# Patient Record
Sex: FEMALE | Race: WHITE | NOT HISPANIC OR LATINO | Employment: UNEMPLOYED | URBAN - NONMETROPOLITAN AREA
[De-identification: names, ages, dates, MRNs, and addresses within clinical notes are randomized per-mention and may not be internally consistent; named-entity substitution may affect disease eponyms.]

---

## 2018-12-11 RX ORDER — MELOXICAM 15 MG/1
15 TABLET ORAL DAILY
COMMUNITY

## 2018-12-11 RX ORDER — AMLODIPINE BESYLATE 5 MG/1
5 TABLET ORAL DAILY
COMMUNITY

## 2018-12-17 ENCOUNTER — ANESTHESIA EVENT (OUTPATIENT)
Dept: GASTROENTEROLOGY | Facility: HOSPITAL | Age: 68
End: 2018-12-17

## 2018-12-17 ENCOUNTER — HOSPITAL ENCOUNTER (OUTPATIENT)
Facility: HOSPITAL | Age: 68
Setting detail: HOSPITAL OUTPATIENT SURGERY
Discharge: HOME | End: 2018-12-17
Attending: INTERNAL MEDICINE | Admitting: INTERNAL MEDICINE

## 2018-12-17 ENCOUNTER — ANESTHESIA (OUTPATIENT)
Dept: GASTROENTEROLOGY | Facility: HOSPITAL | Age: 68
End: 2018-12-17

## 2018-12-17 VITALS
SYSTOLIC BLOOD PRESSURE: 133 MMHG | WEIGHT: 117 LBS | RESPIRATION RATE: 18 BRPM | BODY MASS INDEX: 22.09 KG/M2 | HEART RATE: 85 BPM | TEMPERATURE: 97.9 F | HEIGHT: 61 IN | OXYGEN SATURATION: 98 % | DIASTOLIC BLOOD PRESSURE: 67 MMHG

## 2018-12-17 PROCEDURE — 25010000002 PROPOFOL 10 MG/ML EMULSION: Performed by: NURSE ANESTHETIST, CERTIFIED REGISTERED

## 2018-12-17 RX ORDER — LIDOCAINE HYDROCHLORIDE 20 MG/ML
INJECTION, SOLUTION INFILTRATION; PERINEURAL AS NEEDED
Status: DISCONTINUED | OUTPATIENT
Start: 2018-12-17 | End: 2018-12-17 | Stop reason: SURG

## 2018-12-17 RX ORDER — DEXTROSE AND SODIUM CHLORIDE 5; .45 G/100ML; G/100ML
20 INJECTION, SOLUTION INTRAVENOUS CONTINUOUS
Status: DISCONTINUED | OUTPATIENT
Start: 2018-12-17 | End: 2018-12-17 | Stop reason: HOSPADM

## 2018-12-17 RX ORDER — ONDANSETRON 2 MG/ML
4 INJECTION INTRAMUSCULAR; INTRAVENOUS ONCE AS NEEDED
Status: DISCONTINUED | OUTPATIENT
Start: 2018-12-17 | End: 2018-12-17 | Stop reason: HOSPADM

## 2018-12-17 RX ORDER — PROPOFOL 10 MG/ML
VIAL (ML) INTRAVENOUS AS NEEDED
Status: DISCONTINUED | OUTPATIENT
Start: 2018-12-17 | End: 2018-12-17 | Stop reason: SURG

## 2018-12-17 RX ADMIN — PROPOFOL 20 MG: 10 INJECTION, EMULSION INTRAVENOUS at 13:15

## 2018-12-17 RX ADMIN — PROPOFOL 70 MG: 10 INJECTION, EMULSION INTRAVENOUS at 13:13

## 2018-12-17 RX ADMIN — DEXTROSE AND SODIUM CHLORIDE 20 ML/HR: 5; 450 INJECTION, SOLUTION INTRAVENOUS at 12:06

## 2018-12-17 RX ADMIN — PROPOFOL 20 MG: 10 INJECTION, EMULSION INTRAVENOUS at 13:21

## 2018-12-17 RX ADMIN — LIDOCAINE HYDROCHLORIDE 80 MG: 20 INJECTION, SOLUTION INFILTRATION; PERINEURAL at 13:13

## 2018-12-17 RX ADMIN — PROPOFOL 20 MG: 10 INJECTION, EMULSION INTRAVENOUS at 13:18

## 2018-12-17 NOTE — ANESTHESIA POSTPROCEDURE EVALUATION
Patient: Irma Chand Tripp    Procedure Summary     Date:  12/17/18 Room / Location:  [facility] ENDOSCOPY 2 / [facility] ENDOSCOPY    Anesthesia Start:  1310 Anesthesia Stop:  1326    Procedure:  COLONOSCOPY (N/A ) Diagnosis:       Abnormal stools      (Abnormal stools [R19.5])    Surgeon:  Jitendra Kaiser DO Provider:  Chandrakant Siddiqui CRNA    Anesthesia Type:  MAC ASA Status:  2          Anesthesia Type: MAC  Last vitals  BP   179/83 (12/17/18 1157)   Temp   98.4 °F (36.9 °C) (12/17/18 1157)   Pulse   95 (12/17/18 1157)   Resp   18 (12/17/18 1157)     SpO2   99 % (12/17/18 1157)     Post Anesthesia Care and Evaluation    Patient location during evaluation: PHASE II  Level of consciousness: awake  Pain score: 0  Pain management: adequate  Airway patency: patent  Anesthetic complications: No anesthetic complications  PONV Status: none  Cardiovascular status: acceptable and hemodynamically stable  Respiratory status: acceptable, spontaneous ventilation and room air  Hydration status: acceptable

## 2018-12-17 NOTE — H&P
Cat Cuevas DO,Psychiatric  Gastroenterology  Hepatology  Endoscopy  Board Certified in Internal Medicine and gastroenterology  [address], [address]  [place]. [address]  - [phone]   F - [phone]     GASTROENTEROLOGY HISTORY AND PHYSICAL  NOTE   CAT CUEVAS DO.         SUBJECTIVE:   12/17/2018    Name: Irma Almaguer  DOD: 1950        Chief Complaint:       Subjective : Positive [place] guard.  Screening    Patient is 68 y.o. female presents with desire for screening colonoscopy.      ROS/HISTORY/ CURRENT MEDICATIONS/OBJECTIVE/VS/PE:   Review of Systems:  All systems unremarkable unless specified below.  Constitutional   HENT  Eyes   Respiratory    Cardiovascular  Gastrointestinal   Endocrine  Genitourinary    Musculoskeletal   Skin  Allergic/Immunologic    Neurological    Hematological  Psychiatric/Behavioral    History:     Past Medical History:   Diagnosis Date   • Anxiety    • Hypertension      Past Surgical History:   Procedure Laterality Date   • KNEE ARTHROSCOPY     • SHOULDER ARTHROSCOPY       History reviewed. No pertinent family history.  Social History     Tobacco Use   • Smoking status: Never Smoker   • Smokeless tobacco: Never Used   Substance Use Topics   • Alcohol use: No     Frequency: Never   • Drug use: No     Medications Prior to Admission   Medication Sig Dispense Refill Last Dose   • amLODIPine (NORVASC) 5 MG tablet Take 5 mg by mouth Daily.   12/17/2018 at Unknown time   • meloxicam (MOBIC) 15 MG tablet Take 15 mg by mouth Daily.   12/16/2018 at Unknown time     Allergies:  Patient has no known allergies.    I have reviewed the patients medical history, surgical history and family history in the available medical record system.     Current Medications:     Current Facility-Administered Medications   Medication Dose Route Frequency Provider Last Rate Last Dose   • dextrose 5 % and sodium chloride 0.45 % infusion  20 mL/hr Intravenous Continuous Job  Jitendra SANTIZO, DO 20 mL/hr at 12/17/18 1206 20 mL/hr at 12/17/18 1206   • ondansetron (ZOFRAN) injection 4 mg  4 mg Intravenous Once PRN Chandrakant Siddiqui CRNA           Objective     Physical Exam:   Temp:  [98.4 °F (36.9 °C)] 98.4 °F (36.9 °C)  Heart Rate:  [95] 95  Resp:  [18] 18  BP: (179)/(83) 179/83    Physical Exam:  General Appearance:    Alert, cooperative, in no acute distress   Head:    Normocephalic, without obvious abnormality, atraumatic   Eyes:            Lids and lashes normal, conjunctivae and sclerae normal, no   icterus, no pallor, corneas clear, PERRLA   Ears:    Ears appear intact with no abnormalities noted   Throat:   No oral lesions, no thrush, oral mucosa moist   Neck:   No adenopathy, supple, trachea midline, no thyromegaly, no     carotid bruit, no JVD   Back:     No kyphosis present, no scoliosis present, no skin lesions,       erythema or scars, no tenderness to percussion or                   palpation,   range of motion normal   Lungs:     Clear to auscultation,respirations regular, even and                   unlabored    Heart:    Regular rhythm and normal rate, normal S1 and S2, no            murmur, no gallop, no rub, no click   Breast Exam:    Deferred   Abdomen:     Normal bowel sounds, no masses, no organomegaly, soft        non-tender, non-distended, no guarding, no rebound                 tenderness   Genitalia:    Deferred   Extremities:   Moves all extremities well, no edema, no cyanosis, no              redness   Pulses:   Pulses palpable and equal bilaterally   Skin:   No bleeding, bruising or rash   Lymph nodes:   No palpable adenopathy   Neurologic:   Cranial nerves 2 - 12 grossly intact, sensation intact, DTR        present and equal bilaterally      Results Review:     Lab Results   Component Value Date    WBC 7.1 03/10/2014    HGB 12.3 03/10/2014    HCT 36.6 03/10/2014     03/10/2014             No results found for: LIPASE  No results found for: INR       Radiology  Review:  Imaging Results (last 72 hours)     ** No results found for the last 72 hours. **           I reviewed the patient's new clinical results.  I reviewed the patient's new imaging results and agree with the interpretation.     ASSESSMENT/PLAN:   ASSESSMENT:   1.  Screening examination for routine risk for colon cancer  2.  Positive [place] guard    PLAN:   1.  Colonoscopy    Risk and benefits associated with the procedure are reviewed with the patient.  The patient wishes to proceed      Jitendra Kaiser DO  12/17/18  1:07 PM

## 2018-12-17 NOTE — ANESTHESIA PREPROCEDURE EVALUATION
Anesthesia Evaluation     Patient summary reviewed   NPO Solid Status: > 8 hours  NPO Liquid Status: > 2 hours           Airway   Mallampati: II  TM distance: >3 FB  Dental      Pulmonary - negative pulmonary ROS and normal exam   Cardiovascular - normal exam    (+) hypertension poorly controlled less than 2 medications,       Neuro/Psych  (+) psychiatric history Anxiety,     GI/Hepatic/Renal/Endo - negative ROS     Musculoskeletal     Abdominal  - normal exam   Substance History      OB/GYN          Other                        Anesthesia Plan    ASA 2     MAC     intravenous induction   Anesthetic plan, all risks, benefits, and alternatives have been provided, discussed and informed consent has been obtained with: patient.

## 2020-03-10 ENCOUNTER — TRANSCRIBE ORDERS (OUTPATIENT)
Dept: PHYSICAL THERAPY | Facility: HOSPITAL | Age: 70
End: 2020-03-10

## 2020-03-10 DIAGNOSIS — M75.01 ADHESIVE CAPSULITIS OF RIGHT SHOULDER: Primary | ICD-10-CM

## 2020-03-11 ENCOUNTER — HOSPITAL ENCOUNTER (OUTPATIENT)
Dept: PHYSICAL THERAPY | Facility: HOSPITAL | Age: 70
Setting detail: THERAPIES SERIES
Discharge: HOME | End: 2020-03-11

## 2020-03-11 DIAGNOSIS — M12.811 ROTATOR CUFF TEAR ARTHROPATHY OF RIGHT SHOULDER: Primary | ICD-10-CM

## 2020-03-11 DIAGNOSIS — M75.101 ROTATOR CUFF TEAR ARTHROPATHY OF RIGHT SHOULDER: Primary | ICD-10-CM

## 2020-03-11 PROCEDURE — 97161 PT EVAL LOW COMPLEX 20 MIN: CPT

## 2020-03-11 PROCEDURE — 97140 MANUAL THERAPY 1/> REGIONS: CPT

## 2020-03-11 NOTE — THERAPY EVALUATION
Outpatient Physical Therapy Ortho Initial Evaluation  [facility]     Patient Name: Irma Almaguer  : 1950  MRN: [id number]  Today's Date: 3/11/2020      Visit Date: 2020  Subjective Improvement: na R shoulder surgery 3/4/2020  Attendance:    Next MD Visit : 3/17/2020  Recert Date:  2020      Therapy Diagnosis:  R shoulder surgery      There is no problem list on file for this patient.       Allergies        No Known Allergies   Juan as Reviewed Reviewed by You at 11:12 AM CDT     Medications (Admitted on 3/11/2020)     amLODIPine (NORVASC) 5 MG tablet     meloxicam (MOBIC) 15 MG tablet        Past Medical History:   Diagnosis Date   • Anxiety    • Hypertension         Past Surgical History:   Procedure Laterality Date   • COLONOSCOPY N/A 2018    Procedure: COLONOSCOPY;  Surgeon: Jitendra Kaiser DO;  Location: [facility] ENDOSCOPY;  Service: Gastroenterology   • KNEE ARTHROSCOPY     • SHOULDER ARTHROSCOPY         Visit Dx:     ICD-10-CM ICD-9-CM   1. Rotator cuff tear arthropathy of right shoulder M75.101 716.81    M12.811            PT Ortho     Row Name 20 1100       Subjective Comments    Subjective Comments  pt is s/p R shoulder to take screws out and put pig skin on. pt c/o pain that is   -PD       Precautions and Contraindications    Precautions/Limitations  shoulder precautions  -PD       Subjective Pain    Able to rate subjective pain?  yes  -PD    Pre-Treatment Pain Level  0  -PD       Posture/Observations    Posture- WNL  Posture is WNL  -PD       General ROM    GENERAL ROM COMMENTS  PROM R shoulder flx 90, abd 90, ER 15  -PD       MMT (Manual Muscle Testing)    General MMT Comments  R shoulder over all 3/5  -PD      User Key  (r) = Recorded By, (t) = Taken By, (c) = Cosigned By    Initials Name Provider Type    PD Nick Balderas, PT Physical Therapist              PT OP Goals     Row Name 20 1100          PT Short Term Goals    STG Date to Achieve   03/25/20  -PD     STG 1  pt will be ind with HEP  -PD     STG 2  pt will dem increased R shoulder ROM by 25 deg  -PD     STG 3  pt will report decreased pain level by 2 levels  -PD     STG 4  pt will dem decrease in quick dash score by 5  -PD        Long Term Goals    LTG Date to Achieve  04/08/20  -PD     LTG 1  same as STG  -PD        Time Calculation    PT Goal Re-Cert Due Date  04/01/20  -PD       User Key  (r) = Recorded By, (t) = Taken By, (c) = Cosigned By    Initials Name Provider Type    Nick Hooks, PT Physical Therapist          PT Assessment/Plan     Row Name 03/11/20 1100          PT Assessment    Functional Limitations  Impaired locomotion;Limitation in home management;Limitations in community activities;Limitations in functional capacity and performance;Performance in leisure activities;Performance in self-care ADL;Performance in work activities;Performance in sport activities  -PD     Impairments  Muscle strength;Pain;Posture;Range of motion  -PD     Assessment Comments  pt is s/p R shoulder surgergy 3/4/2020. limited ROM and decreased muslce strength d/t surgery and pain.   -PD     Please refer to paper survey for additional self-reported information  Yes  -PD     Rehab Potential  Good  -PD     Patient/caregiver participated in establishment of treatment plan and goals  Yes  -PD     Patient would benefit from skilled therapy intervention  Yes  -PD        PT Plan    PT Frequency  2x/week  -PD     Predicted Duration of Therapy Intervention (Therapy Eval)  6-8 week  -PD     Planned CPT's?  PT EVAL LOW COMPLEXITY: 01076;PT THER PROC EA 15 MIN: 17098;PT THER ACT EA 15 MIN: 23069;PT MANUAL THERAPY EA 15 MIN: 91222;PT NEUROMUSC RE-EDUCATION EA 15 MIN: 77046;PT GAIT TRAINING EA 15 MIN: 96260;PT HOT OR COLD PACK TREAT MCARE  -PD     PT Plan Comments  PROM, pendulum  -PD       User Key  (r) = Recorded By, (t) = Taken By, (c) = Cosigned By    Initials Name Provider Type    Nick Hooks PT Physical  Therapist          Modalities     Row Name 03/11/20 1100             Ice    Ice Applied  Yes  -PD      Location  R shoulder  -PD      Rx Minutes  10 mins  -PD        User Key  (r) = Recorded By, (t) = Taken By, (c) = Cosigned By    Initials Name Provider Type    Nick Hooks PT Physical Therapist        OP Exercises     Row Name 03/11/20 1100             Subjective Comments    Subjective Comments  pt is s/p R shoulder to take screws out and put pig skin on. pt c/o pain that is   -PD         Subjective Pain    Able to rate subjective pain?  yes  -PD      Pre-Treatment Pain Level  0  -PD         Exercise 1    Exercise Name 1  PROM R shoulder all direction  -PD      Time 1  5 min  -PD         Exercise 2    Exercise Name 2  pendulum  -PD      Time 2  5 min  -PD         Exercise 3    Exercise Name 3  elbow flx/ext  -PD      Sets 3  2  -PD      Reps 3  15  -PD         Exercise 4    Exercise Name 4  wrist flx/ext  -PD      Sets 4  2  -PD      Reps 4  15  -PD         Exercise 5    Exercise Name 5  open and close hands  -PD      Sets 5  2  -PD      Reps 5  15  -PD        User Key  (r) = Recorded By, (t) = Taken By, (c) = Cosigned By    Initials Name Provider Type    Nick Hooks PT Physical Therapist               Outcome Measure Options: Quick DASH  Quick DASH  Open a tight or new jar.: Unable  Do heavy household chores (e.g., wash walls, wash floors): Unable  Carry a shopping bag or briefcase: Unable  Wash your back: Unable  Use a knife to cut food: Unable  Recreational activities in which you take some force or impact through your arm, should or hand (e.g. golf, hammering, tennis, etc.): Unable  During the past week, to what extent has your arm, shoulder, or hand problem interfered with your normal social activites with family, friends, neighbors or groups?: Quite a bit  During the past week, were you limited in your work or other regular daily activities as a result of your arm, shoulder or hand problem?:  Very limited  Arm, Shoulder, or hand pain: Mild  Tingling (pins and needles) in your arm, shoulder, or hand: None  During the past week, how much difficulty have you had sleeping because of the pain in your arm, shoulder or hand?: No difficulty  Number of Questions Answered: 11  Quick DASH Score: 70.45         Time Calculation:           Therapy Charges for Today     Code Description Service Date Service Provider Modifiers Qty    37079926286 HC PT EVAL LOW COMPLEXITY 2 3/11/2020 Nick Balderas, PT GP 1    51796739611 HC PT MANUAL THERAPY EA 15 MIN 3/11/2020 Nick Balderas, PT GP 1          PT G-Codes  Outcome Measure Options: Quick DASH  Quick DASH Score: 70.45         Nick Balderas, PT  3/11/2020

## 2020-03-16 ENCOUNTER — APPOINTMENT (OUTPATIENT)
Dept: PHYSICAL THERAPY | Facility: HOSPITAL | Age: 70
End: 2020-03-16

## 2020-03-19 ENCOUNTER — APPOINTMENT (OUTPATIENT)
Dept: PHYSICAL THERAPY | Facility: HOSPITAL | Age: 70
End: 2020-03-19

## (undated) DEVICE — CANN SMPL SOFTECH BIFLO ETCO2 A/M 7FT